# Patient Record
Sex: MALE | Race: BLACK OR AFRICAN AMERICAN | Employment: OTHER | URBAN - METROPOLITAN AREA
[De-identification: names, ages, dates, MRNs, and addresses within clinical notes are randomized per-mention and may not be internally consistent; named-entity substitution may affect disease eponyms.]

---

## 2018-09-25 ENCOUNTER — APPOINTMENT (OUTPATIENT)
Dept: GENERAL RADIOLOGY | Age: 63
End: 2018-09-25
Attending: EMERGENCY MEDICINE
Payer: COMMERCIAL

## 2018-09-25 ENCOUNTER — HOSPITAL ENCOUNTER (EMERGENCY)
Age: 63
Discharge: HOME | End: 2018-09-25
Attending: EMERGENCY MEDICINE
Payer: COMMERCIAL

## 2018-09-25 VITALS
SYSTOLIC BLOOD PRESSURE: 121 MMHG | BODY MASS INDEX: 25.2 KG/M2 | DIASTOLIC BLOOD PRESSURE: 73 MMHG | OXYGEN SATURATION: 98 % | RESPIRATION RATE: 16 BRPM | WEIGHT: 180 LBS | HEIGHT: 71 IN | HEART RATE: 59 BPM | TEMPERATURE: 98.7 F

## 2018-09-25 DIAGNOSIS — R07.9 CHEST PAIN, UNSPECIFIED TYPE: Primary | ICD-10-CM

## 2018-09-25 LAB
ALBUMIN SERPL-MCNC: 4.2 G/DL (ref 3.5–5)
ALBUMIN/GLOB SERPL: 1.3 {RATIO} (ref 1.1–2.2)
ALP SERPL-CCNC: 78 U/L (ref 45–117)
ALT SERPL-CCNC: 27 U/L (ref 12–78)
ANION GAP SERPL CALC-SCNC: 4 MMOL/L (ref 5–15)
AST SERPL-CCNC: 18 U/L (ref 15–37)
BASOPHILS # BLD: 0 K/UL (ref 0–0.1)
BASOPHILS NFR BLD: 0 % (ref 0–1)
BILIRUB SERPL-MCNC: 0.6 MG/DL (ref 0.2–1)
BUN SERPL-MCNC: 13 MG/DL (ref 6–20)
BUN/CREAT SERPL: 10 (ref 12–20)
CALCIUM SERPL-MCNC: 9.4 MG/DL (ref 8.5–10.1)
CHLORIDE SERPL-SCNC: 105 MMOL/L (ref 97–108)
CO2 SERPL-SCNC: 30 MMOL/L (ref 21–32)
COMMENT, HOLDF: NORMAL
CREAT SERPL-MCNC: 1.32 MG/DL (ref 0.7–1.3)
DIFFERENTIAL METHOD BLD: NORMAL
EOSINOPHIL # BLD: 0 K/UL (ref 0–0.4)
EOSINOPHIL NFR BLD: 1 % (ref 0–7)
ERYTHROCYTE [DISTWIDTH] IN BLOOD BY AUTOMATED COUNT: 11.9 % (ref 11.5–14.5)
GLOBULIN SER CALC-MCNC: 3.2 G/DL (ref 2–4)
GLUCOSE SERPL-MCNC: 99 MG/DL (ref 65–100)
HCT VFR BLD AUTO: 41 % (ref 36.6–50.3)
HGB BLD-MCNC: 13.3 G/DL (ref 12.1–17)
IMM GRANULOCYTES # BLD: 0 K/UL (ref 0–0.04)
IMM GRANULOCYTES NFR BLD AUTO: 0 % (ref 0–0.5)
LYMPHOCYTES # BLD: 2.1 K/UL (ref 0.8–3.5)
LYMPHOCYTES NFR BLD: 33 % (ref 12–49)
MCH RBC QN AUTO: 30.6 PG (ref 26–34)
MCHC RBC AUTO-ENTMCNC: 32.4 G/DL (ref 30–36.5)
MCV RBC AUTO: 94.3 FL (ref 80–99)
MONOCYTES # BLD: 0.5 K/UL (ref 0–1)
MONOCYTES NFR BLD: 7 % (ref 5–13)
NEUTS SEG # BLD: 3.7 K/UL (ref 1.8–8)
NEUTS SEG NFR BLD: 59 % (ref 32–75)
NRBC # BLD: 0 K/UL (ref 0–0.01)
NRBC BLD-RTO: 0 PER 100 WBC
PLATELET # BLD AUTO: 207 K/UL (ref 150–400)
PMV BLD AUTO: 9.5 FL (ref 8.9–12.9)
POTASSIUM SERPL-SCNC: 4.5 MMOL/L (ref 3.5–5.1)
PROT SERPL-MCNC: 7.4 G/DL (ref 6.4–8.2)
RBC # BLD AUTO: 4.35 M/UL (ref 4.1–5.7)
SAMPLES BEING HELD,HOLD: NORMAL
SODIUM SERPL-SCNC: 139 MMOL/L (ref 136–145)
TROPONIN I SERPL-MCNC: <0.05 NG/ML
TROPONIN I SERPL-MCNC: <0.05 NG/ML
WBC # BLD AUTO: 6.3 K/UL (ref 4.1–11.1)

## 2018-09-25 PROCEDURE — 85025 COMPLETE CBC W/AUTO DIFF WBC: CPT | Performed by: EMERGENCY MEDICINE

## 2018-09-25 PROCEDURE — 99284 EMERGENCY DEPT VISIT MOD MDM: CPT

## 2018-09-25 PROCEDURE — 71046 X-RAY EXAM CHEST 2 VIEWS: CPT

## 2018-09-25 PROCEDURE — 80053 COMPREHEN METABOLIC PANEL: CPT | Performed by: EMERGENCY MEDICINE

## 2018-09-25 PROCEDURE — 74011250637 HC RX REV CODE- 250/637: Performed by: EMERGENCY MEDICINE

## 2018-09-25 PROCEDURE — 93005 ELECTROCARDIOGRAM TRACING: CPT

## 2018-09-25 PROCEDURE — 84484 ASSAY OF TROPONIN QUANT: CPT | Performed by: EMERGENCY MEDICINE

## 2018-09-25 PROCEDURE — 36415 COLL VENOUS BLD VENIPUNCTURE: CPT | Performed by: EMERGENCY MEDICINE

## 2018-09-25 RX ORDER — GUAIFENESIN 100 MG/5ML
325 LIQUID (ML) ORAL
Status: COMPLETED | OUTPATIENT
Start: 2018-09-25 | End: 2018-09-25

## 2018-09-25 RX ADMIN — NITROGLYCERIN 1 INCH: 20 OINTMENT TOPICAL at 18:54

## 2018-09-25 RX ADMIN — ASPIRIN 81 MG 324 MG: 81 TABLET ORAL at 18:54

## 2018-09-25 NOTE — ED PROVIDER NOTES
HPI Comments: 59yo M presents from home via private vehicle with cc of chest pain. PMH sig for hyperchol, GERD, anxiety, and chest pain. Symptoms have been off and on for past few weeks. Pain located in center of chest with no radiation. Pain worsened with movement and relieved with rest.  No other exacerbating factors. Denies any assoc symptoms. Has not taken pain medication at home. Denies any h/o cad. Denies cig smoking. Pt reports having a stress test years ago that was unremarkable. Patient is a 61 y.o. male presenting with chest pain. The history is provided by the patient. Chest Pain (Angina)    Pertinent negatives include no abdominal pain, no cough, no diaphoresis, no fever and no headaches. Past Medical History:   Diagnosis Date    Anxiety attack     Chest pain     chronic complaint    Diverticulitis     Dyslipidemia      in 8/12    GERD (gastroesophageal reflux disease)     sees Dr. Hendricks Smoker       Past Surgical History:   Procedure Laterality Date    HX HEENT      ulcer removed from nose    HX OTHER SURGICAL      Stress Echo 8/21/12 - walked 10 min; 1.5 mm horizontal ST depressions in the inferolateral leads noted at peak exercise; normal stress echo     HX OTHER SURGICAL      Event Monitor - 8/2012 - patient wore for one week but then returned it since no more palpitations    HX OTHER SURGICAL      Echo [facility]) 8/21/13 - LVEF 60-65%, mild MR,          Family History:   Problem Relation Age of Onset    Cancer Mother     COPD Father        Social History     Social History    Marital status: UNKNOWN     Spouse name: N/A    Number of children: N/A    Years of education: N/A     Occupational History    Not on file.      Social History Main Topics    Smoking status: Former Smoker    Smokeless tobacco: Not on file    Alcohol use No    Drug use: No    Sexual activity: Not on file     Other Topics Concern    Not on file     Social History Narrative    No narrative on file         ALLERGIES: Penicillins    Review of Systems   Constitutional: Negative for diaphoresis and fever. HENT: Negative for facial swelling. Eyes: Negative for visual disturbance. Respiratory: Negative for cough. Cardiovascular: Positive for chest pain. Gastrointestinal: Negative for abdominal pain. Genitourinary: Negative for dysuria. Musculoskeletal: Negative for joint swelling. Skin: Negative for rash. Neurological: Negative for headaches. Hematological: Negative for adenopathy. Psychiatric/Behavioral: Negative for suicidal ideas. Vitals:    09/25/18 1709   BP: 133/82   Pulse: 74   Resp: 14   Temp: 98.7 °F (37.1 °C)   SpO2: 98%   Weight: 81.6 kg (180 lb)   Height: 5' 11\" (1.803 m)            Physical Exam   Constitutional: He is oriented to person, place, and time. He appears well-developed and well-nourished. No distress. HENT:   Head: Normocephalic and atraumatic. Mouth/Throat: Oropharynx is clear and moist.   Eyes: Pupils are equal, round, and reactive to light. Neck: Normal range of motion. Neck supple. Cardiovascular: Normal rate, regular rhythm, normal heart sounds and intact distal pulses. Pulmonary/Chest: Effort normal and breath sounds normal. No respiratory distress. Abdominal: Soft. Bowel sounds are normal. He exhibits no distension. There is no tenderness. Musculoskeletal: Normal range of motion. He exhibits no edema. Neurological: He is alert and oriented to person, place, and time. Skin: Skin is warm and dry. Nursing note and vitals reviewed. MDM  Number of Diagnoses or Management Options  Chest pain, unspecified type:   Diagnosis management comments: A:  Chest pain in 59yo M with no known h/o cad. Other than hyperchol, no other sig risk factors for ACS. P:  ecg  Labs  cxr  [employer]. Repeat trop if first set neg. ED Course       Procedures    ED EKG interpretation:  Rhythm: normal sinus rhythm.  Rate (approx.): 62.  Axis: normal.  ST segment:  No concerning ST elevations or depressions. This EKG was interpreted by Devaughn Swartz MD,ED Provider. Chest X-ray, 2 view:  Chest xray, PA and Lat, shows no signs of acute disease. This CXR was interpreted by Devaughn Swartz MD, ED Provider. Labs unremarkable. Trop and repeat were both neg. No pain at this time. Stable for discharge to f/u with cardiology as outpatient. Return to ED if symptoms worsen.

## 2018-09-26 LAB
ATRIAL RATE: 62 BPM
CALCULATED P AXIS, ECG09: 62 DEGREES
CALCULATED R AXIS, ECG10: 31 DEGREES
CALCULATED T AXIS, ECG11: 35 DEGREES
DIAGNOSIS, 93000: NORMAL
P-R INTERVAL, ECG05: 152 MS
Q-T INTERVAL, ECG07: 374 MS
QRS DURATION, ECG06: 92 MS
QTC CALCULATION (BEZET), ECG08: 379 MS
VENTRICULAR RATE, ECG03: 62 BPM

## 2018-09-26 NOTE — ED NOTES
Discharged by provider. Results and instructions reviewed by Dr. Juan Diego Catalan. Denies questions. Pt in no acute distress at time of discharge. Pt leaves ambulatory.

## 2018-09-26 NOTE — DISCHARGE INSTRUCTIONS
Chest Pain: Care Instructions  Your Care Instructions    There are many things that can cause chest pain. Some are not serious and will get better on their own in a few days. But some kinds of chest pain need more testing and treatment. Your doctor may have recommended a follow-up visit in the next 8 to 12 hours. If you are not getting better, you may need more tests or treatment. Even though your doctor has released you, you still need to watch for any problems. The doctor carefully checked you, but sometimes problems can develop later. If you have new symptoms or if your symptoms do not get better, get medical care right away. If you have worse or different chest pain or pressure that lasts more than 5 minutes or you passed out (lost consciousness), call 911 or seek other emergency help right away. A medical visit is only one step in your treatment. Even if you feel better, you still need to do what your doctor recommends, such as going to all suggested follow-up appointments and taking medicines exactly as directed. This will help you recover and help prevent future problems. How can you care for yourself at home? · Rest until you feel better. · Take your medicine exactly as prescribed. Call your doctor if you think you are having a problem with your medicine. · Do not drive after taking a prescription pain medicine. When should you call for help? Call 911 if:    · You passed out (lost consciousness).     · You have severe difficulty breathing.     · You have symptoms of a heart attack. These may include:  ¨ Chest pain or pressure, or a strange feeling in your chest.  ¨ Sweating. ¨ Shortness of breath. ¨ Nausea or vomiting. ¨ Pain, pressure, or a strange feeling in your back, neck, jaw, or upper belly or in one or both shoulders or arms. ¨ Lightheadedness or sudden weakness. ¨ A fast or irregular heartbeat.   After you call 911, the  may tell you to chew 1 adult-strength or 2 to 4 low-dose aspirin. Wait for an ambulance. Do not try to drive yourself.    Call your doctor today if:    · You have any trouble breathing.     · Your chest pain gets worse.     · You are dizzy or lightheaded, or you feel like you may faint.     · You are not getting better as expected.     · You are having new or different chest pain. Where can you learn more? Go to http://shalonda-trang.info/. Enter A120 in the search box to learn more about \"Chest Pain: Care Instructions. \"  Current as of: November 20, 2017  Content Version: 11.7  © 7484-5826 "[employer]. Care instructions adapted under license by [employer] (which disclaims liability or warranty for this information). If you have questions about a medical condition or this instruction, always ask your healthcare professional. [address] any warranty or liability for your use of this information. We hope that we have addressed all of your medical concerns. The examination and treatment you received in the Emergency Department were for an emergent problem and were not intended as complete care. It is important that you follow up with your healthcare provider(s) for ongoing care. If your symptoms worsen or do not improve as expected, and you are unable to reach your usual health care provider(s), you should return to the Emergency Department. Today's healthcare is undergoing tremendous change, and patient satisfaction surveys are one of the many tools to assess the quality of medical care. You may receive a survey from the [employer] organization regarding your experience in the Emergency Department. I hope that your experience has been completely positive, particularly the medical care that I provided. As such, please participate in the survey; anything less than excellent does not meet my expectations or intentions.         [facility] and [facility] participate in nationally recognized quality of care measures. If your blood pressure is greater than 120/80, as reported below, we urge that you seek medical care to address the potential of high blood pressure, commonly known as hypertension. Hypertension can be hereditary or can be caused by certain medical conditions, pain, stress, or \"white coat syndrome. \"       Please make an appointment with your health care provider(s) for follow up of your Emergency Department visit. VITALS:   Patient Vitals for the past 8 hrs:   Temp Pulse Resp BP SpO2   09/25/18 1948 - (!) 59 16 125/78 98 %   09/25/18 1854 - 70 - 138/76 -   09/25/18 1709 98.7 °F (37.1 °C) 74 14 133/82 98 %          Thank you for allowing us to provide you with medical care today. We realize that you have many choices for your emergency care needs. Please choose us in the future for any continued health care needs.       Tarah Emerson MD    [facility], [facility].   Office: [phone]            Recent Results (from the past 24 hour(s))   EKG, 12 LEAD, INITIAL    Collection Time: 09/25/18  5:08 PM   Result Value Ref Range    Ventricular Rate 62 BPM    Atrial Rate 62 BPM    P-R Interval 152 ms    QRS Duration 92 ms    Q-T Interval 374 ms    QTC Calculation (Bezet) 379 ms    Calculated P Axis 62 degrees    Calculated R Axis 31 degrees    Calculated T Axis 35 degrees    Diagnosis       Normal sinus rhythm with sinus arrhythmia  Normal ECG  When compared with ECG of 12-SEP-2013 10:11,  No significant change was found     CBC WITH AUTOMATED DIFF    Collection Time: 09/25/18  6:46 PM   Result Value Ref Range    WBC 6.3 4.1 - 11.1 K/uL    RBC 4.35 4.10 - 5.70 M/uL    HGB 13.3 12.1 - 17.0 g/dL    HCT 41.0 36.6 - 50.3 %    MCV 94.3 80.0 - 99.0 FL    MCH 30.6 26.0 - 34.0 PG    MCHC 32.4 30.0 - 36.5 g/dL    RDW 11.9 11.5 - 14.5 %    PLATELET 456 118 - 084 K/uL    MPV 9.5 8.9 - 12.9 FL    NRBC 0.0 0  WBC    ABSOLUTE NRBC 0.00 0.00 - 0.01 K/uL    NEUTROPHILS 59 32 - 75 %    LYMPHOCYTES 33 12 - 49 %    MONOCYTES 7 5 - 13 %    EOSINOPHILS 1 0 - 7 %    BASOPHILS 0 0 - 1 %    IMMATURE GRANULOCYTES 0 0.0 - 0.5 %    ABS. NEUTROPHILS 3.7 1.8 - 8.0 K/UL    ABS. LYMPHOCYTES 2.1 0.8 - 3.5 K/UL    ABS. MONOCYTES 0.5 0.0 - 1.0 K/UL    ABS. EOSINOPHILS 0.0 0.0 - 0.4 K/UL    ABS. BASOPHILS 0.0 0.0 - 0.1 K/UL    ABS. IMM. GRANS. 0.0 0.00 - 0.04 K/UL    DF AUTOMATED     METABOLIC PANEL, COMPREHENSIVE    Collection Time: 09/25/18  6:46 PM   Result Value Ref Range    Sodium 139 136 - 145 mmol/L    Potassium 4.5 3.5 - 5.1 mmol/L    Chloride 105 97 - 108 mmol/L    CO2 30 21 - 32 mmol/L    Anion gap 4 (L) 5 - 15 mmol/L    Glucose 99 65 - 100 mg/dL    BUN 13 6 - 20 MG/DL    Creatinine 1.32 (H) 0.70 - 1.30 MG/DL    BUN/Creatinine ratio 10 (L) 12 - 20      GFR est AA >60 >60 ml/min/1.73m2    GFR est non-AA 55 (L) >60 ml/min/1.73m2    Calcium 9.4 8.5 - 10.1 MG/DL    Bilirubin, total 0.6 0.2 - 1.0 MG/DL    ALT (SGPT) 27 12 - 78 U/L    AST (SGOT) 18 15 - 37 U/L    Alk. phosphatase 78 45 - 117 U/L    Protein, total 7.4 6.4 - 8.2 g/dL    Albumin 4.2 3.5 - 5.0 g/dL    Globulin 3.2 2.0 - 4.0 g/dL    A-G Ratio 1.3 1.1 - 2.2     TROPONIN I    Collection Time: 09/25/18  6:46 PM   Result Value Ref Range    Troponin-I, Qt. <0.05 <0.05 ng/mL   SAMPLES BEING HELD    Collection Time: 09/25/18  6:46 PM   Result Value Ref Range    SAMPLES BEING HELD SST. KARLY     COMMENT        Add-on orders for these samples will be processed based on acceptable specimen integrity and analyte stability, which may vary by analyte. TROPONIN I    Collection Time: 09/25/18  7:47 PM   Result Value Ref Range    Troponin-I, Qt. <0.05 <0.05 ng/mL       Xr Chest Pa Lat    Result Date: 9/25/2018  EXAM:  XR CHEST PA LAT INDICATION:   Chest pain with movement for the past couple of weeks and back pain. COMPARISON: Chest x-ray 9/12/2013.  FINDINGS: PA and lateral radiographs of the chest demonstrate clear lungs. The cardiac and mediastinal contours and pulmonary vascularity are normal.  The bones and soft tissues are within normal limits.      IMPRESSION: Normal.

## 2022-05-21 LAB — HBA1C MFR BLD HPLC: 6.1 %

## 2023-01-25 ENCOUNTER — OFFICE VISIT (OUTPATIENT)
Dept: CARDIOLOGY CLINIC | Age: 68
End: 2023-01-25
Payer: MEDICARE

## 2023-01-25 VITALS
SYSTOLIC BLOOD PRESSURE: 162 MMHG | WEIGHT: 205.2 LBS | HEART RATE: 56 BPM | BODY MASS INDEX: 28.73 KG/M2 | OXYGEN SATURATION: 99 % | DIASTOLIC BLOOD PRESSURE: 90 MMHG | HEIGHT: 71 IN

## 2023-01-25 DIAGNOSIS — E78.5 DYSLIPIDEMIA: ICD-10-CM

## 2023-01-25 DIAGNOSIS — R07.9 CHEST PAIN, UNSPECIFIED TYPE: Primary | ICD-10-CM

## 2023-01-25 DIAGNOSIS — F41.0 ANXIETY ATTACK: ICD-10-CM

## 2023-01-25 DIAGNOSIS — R06.02 SOB (SHORTNESS OF BREATH): ICD-10-CM

## 2023-01-25 PROCEDURE — G8427 DOCREV CUR MEDS BY ELIG CLIN: HCPCS | Performed by: SPECIALIST

## 2023-01-25 PROCEDURE — G8536 NO DOC ELDER MAL SCRN: HCPCS | Performed by: SPECIALIST

## 2023-01-25 PROCEDURE — G8417 CALC BMI ABV UP PARAM F/U: HCPCS | Performed by: SPECIALIST

## 2023-01-25 PROCEDURE — 93000 ELECTROCARDIOGRAM COMPLETE: CPT | Performed by: SPECIALIST

## 2023-01-25 PROCEDURE — G8510 SCR DEP NEG, NO PLAN REQD: HCPCS | Performed by: SPECIALIST

## 2023-01-25 PROCEDURE — 1101F PT FALLS ASSESS-DOCD LE1/YR: CPT | Performed by: SPECIALIST

## 2023-01-25 PROCEDURE — 99204 OFFICE O/P NEW MOD 45 MIN: CPT | Performed by: SPECIALIST

## 2023-01-25 PROCEDURE — 3017F COLORECTAL CA SCREEN DOC REV: CPT | Performed by: SPECIALIST

## 2023-01-25 PROCEDURE — 1123F ACP DISCUSS/DSCN MKR DOCD: CPT | Performed by: SPECIALIST

## 2023-01-25 RX ORDER — PANTOPRAZOLE SODIUM 40 MG/1
TABLET, DELAYED RELEASE ORAL
COMMUNITY
Start: 2022-10-24

## 2023-01-25 RX ORDER — SUCRALFATE 1 G/1
TABLET ORAL
COMMUNITY
Start: 2023-01-17

## 2023-01-25 RX ORDER — ROSUVASTATIN CALCIUM 10 MG/1
10 TABLET, COATED ORAL
Qty: 30 TABLET | Refills: 0
Start: 2023-01-25

## 2023-01-25 RX ORDER — AMLODIPINE BESYLATE 5 MG/1
TABLET ORAL
COMMUNITY
Start: 2023-01-19

## 2023-01-25 RX ORDER — CYCLOBENZAPRINE HCL 10 MG
TABLET ORAL
COMMUNITY

## 2023-01-25 RX ORDER — MECLIZINE HYDROCHLORIDE 25 MG/1
TABLET ORAL
COMMUNITY

## 2023-01-25 RX ORDER — ZOLPIDEM TARTRATE 10 MG/1
TABLET ORAL
COMMUNITY
Start: 2022-12-29

## 2023-01-25 RX ORDER — LOSARTAN POTASSIUM 25 MG/1
TABLET ORAL
COMMUNITY
Start: 2022-12-29

## 2023-01-25 RX ORDER — PIOGLITAZONEHYDROCHLORIDE 30 MG/1
TABLET ORAL
COMMUNITY
Start: 2022-10-20

## 2023-01-25 NOTE — PROGRESS NOTES
Chief Complaint   Patient presents with    New Patient    Chest Pain    Referral / Consult     Referred By PCP        Vitals:    01/25/23 1353 01/25/23 1408   BP: (!) 158/84 (!) 162/90   BP 1 Location: Left arm    Pulse: (!) 56    Height: 5' 11\" (1.803 m)    Weight: 205 lb 3.2 oz (93.1 kg)    SpO2: 99%          Chest pain: tightness    SOB: no    Palpitations: no    Dizziness: no    Swelling: no    Refills:       Have you been to the ER, urgent care clinic since your last visit? [facility]    Have you seen or consulted other health care providers outside of the [facility] since your last visit?  (Include any pap smears or colon screening.)      Fatigue

## 2023-01-25 NOTE — PROGRESS NOTES
Julissa Pike MD. [facility]          Patient: Ember Philip  : 1955      Today's Date: 2023        HISTORY OF PRESENT ILLNESS:     History of Present Illness:  Referred for HTN - I saw him 10 years ago. Doesn't feel good. No energy. Tired. Has had problems for years. Dizzy at times. PHELAN class 3. Has a \"lottle bit\" of random chest pain (or when turning his neck). Went to ER last week - [facility] - went home from ED. PAST MEDICAL HISTORY:     Past Medical History:   Diagnosis Date    Anxiety attack     Chest pain     chronic complaint    Diverticulitis     Dyslipidemia      in     GERD (gastroesophageal reflux disease)     sees Dr. Awan Right    History of prediabetes     HTN (hypertension)     CHERELLE on CPAP        Past Surgical History:   Procedure Laterality Date    HX HEENT      ulcer removed from nose    HX OTHER SURGICAL      Stress Echo 12 - walked 10 min; 1.5 mm horizontal ST depressions in the inferolateral leads noted at peak exercise; normal stress echo     HX OTHER SURGICAL      Event Monitor - 2012 - patient wore for one week but then returned it since no more palpitations    HX OTHER SURGICAL      Echo [facility]) 13 - LVEF 60-65%, mild MR,              CURRENT MEDICATIONS:    .  Current Outpatient Medications   Medication Sig Dispense Refill    rosuvastatin (CRESTOR) 10 mg tablet Take 1 Tablet by mouth nightly. 30 Tablet 0       Allergies   Allergen Reactions    Penicillins Other (comments)     Felt faint, dizzy         SOCIAL HISTORY:     Social History     Tobacco Use    Smoking status: Former   Substance Use Topics    Alcohol use: No    Drug use: No         FAMILY HISTORY:     Family History   Problem Relation Age of Onset    Cancer Mother     COPD Father          REVIEW OF SYMPTOMS:     Review of Symptoms:  Constitutional: Negative for fever, chills  HEENT: Negative for nosebleeds, tinnitus, and vision changes.    Respiratory: Negative for cough, wheezing  Cardiovascular: Negative for orthopnea, claudication, syncope, and PND. Gastrointestinal: Negative for abdominal pain, diarrhea, melena. Genitourinary: Negative for dysuria  Musculoskeletal: Negative for myalgias. Skin: Negative for rash  Heme: No problems bleeding. Neurological: Negative for speech change and focal weakness. PHYSICAL EXAM:     Physical Exam:  Visit Vitals  BP (!) 162/90   Pulse (!) 56   Ht 5' 11\" (1.803 m)   Wt 205 lb 3.2 oz (93.1 kg)   SpO2 99%   BMI 28.62 kg/m²       Patient appears generally well, mood and affect are appropriate and pleasant. HEENT:  Hearing intact, non-icteric, normocephalic, atraumatic. Neck Exam: Supple, No JVD or carotid bruits. Lung Exam: Clear to auscultation, even breath sounds. Cardiac Exam: Regular rate and rhythm with no murmur or rub  Abdomen: Soft, non-tender  Extremities: Moves all ext well. No lower extremity edema. MSKTL: Overall good ROM ext  Skin: No significant rashes  Psych: Appropriate affect  Neuro - Grossly intact        LABS / OTHER STUDIES:       Labs 5/22 - Cr 1.5, K 4.9, LF'Ts OK, chol 163, TG 76, HDL 55, LDL 94, TG 76, A1c 6.1  Labs 1/4/23 - Cr 1.35, CBC OK, K 5.0, chol 202, TG 75, HDL 59, , LFT's OK       CARDIAC DIAGNOSTICS:     Cardiac Evaluation Includes:  I reviewed the test results below.       EKG 1/25/23 - sinus tate, normal           ASSESSMENT AND PLAN:     Assessment and Plan:    Atypical CP,  SOB, fatigue  - has had symptoms for years   - check an exercise cardiolite and echo     HTN  - he is unsure of medications   - he says -150's lately   - he just got a medication yesterday evening but does not know what it is -- he is to start it tonight   - see him back in one month to reassess HTN  - ADDENDUM - he just picked up amlodipine and losartan and is to start that tonight     Dyslipidemia   - cont statin   - FU lipids later     CKD   - follow     Obesity   - discussed diet / exercise     See NP in one month for HTN      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 . Errol Hickey MD, [address], [address]  [place] [address] Suite [facility] [address], [facility] [address]  [address]  Ph: [phone]   Ph [phone]      [facility]   1/26/2023    [facility] records reviewed. Seen 1/16/23 for CP and palpitations.  Labs 1/16/23 - Cr 1.56 - Trop, D-Dimer, BNP all normal.     EKG 1/16/23 - NSR, non-specific T wave abn

## 2023-01-25 NOTE — PROGRESS NOTES
Orders for Check an exercise cardiolite and echo soon (SOB, CP)     See Elver Zabala in 1 month  per Dr. Ethyl Sever VO.

## 2023-03-13 ENCOUNTER — ANCILLARY PROCEDURE (OUTPATIENT)
Dept: CARDIOLOGY CLINIC | Age: 68
End: 2023-03-13

## 2023-03-13 VITALS — HEIGHT: 71 IN | BODY MASS INDEX: 28.7 KG/M2 | WEIGHT: 205 LBS

## 2023-03-13 VITALS
WEIGHT: 205 LBS | DIASTOLIC BLOOD PRESSURE: 72 MMHG | SYSTOLIC BLOOD PRESSURE: 126 MMHG | BODY MASS INDEX: 28.7 KG/M2 | HEIGHT: 71 IN

## 2023-03-13 DIAGNOSIS — R06.02 SOB (SHORTNESS OF BREATH): ICD-10-CM

## 2023-03-13 DIAGNOSIS — R07.9 CHEST PAIN, UNSPECIFIED TYPE: ICD-10-CM

## 2023-03-13 RX ORDER — TETRAKIS(2-METHOXYISOBUTYLISOCYANIDE)COPPER(I) TETRAFLUOROBORATE 1 MG/ML
30 INJECTION, POWDER, LYOPHILIZED, FOR SOLUTION INTRAVENOUS ONCE
Status: COMPLETED | OUTPATIENT
Start: 2023-03-13 | End: 2023-03-13

## 2023-03-13 RX ORDER — TETRAKIS(2-METHOXYISOBUTYLISOCYANIDE)COPPER(I) TETRAFLUOROBORATE 1 MG/ML
10 INJECTION, POWDER, LYOPHILIZED, FOR SOLUTION INTRAVENOUS ONCE
Status: COMPLETED | OUTPATIENT
Start: 2023-03-13 | End: 2023-03-13

## 2023-03-13 RX ADMIN — TETRAKIS(2-METHOXYISOBUTYLISOCYANIDE)COPPER(I) TETRAFLUOROBORATE 7.9 MILLICURIE: 1 INJECTION, POWDER, LYOPHILIZED, FOR SOLUTION INTRAVENOUS at 09:18

## 2023-03-13 RX ADMIN — TETRAKIS(2-METHOXYISOBUTYLISOCYANIDE)COPPER(I) TETRAFLUOROBORATE 26.4 MILLICURIE: 1 INJECTION, POWDER, LYOPHILIZED, FOR SOLUTION INTRAVENOUS at 10:31

## 2023-03-14 LAB
ECHO AO ASC DIAM: 3.4 CM
ECHO AO ASCENDING AORTA INDEX: 1.6 CM/M2
ECHO AO ROOT DIAM: 3.4 CM
ECHO AO ROOT INDEX: 1.6 CM/M2
ECHO AV AREA PEAK VELOCITY: 3.7 CM2
ECHO AV AREA VTI: 3.5 CM2
ECHO AV AREA/BSA PEAK VELOCITY: 1.7 CM2/M2
ECHO AV AREA/BSA VTI: 1.6 CM2/M2
ECHO AV MEAN GRADIENT: 3 MMHG
ECHO AV MEAN VELOCITY: 0.8 M/S
ECHO AV PEAK GRADIENT: 5 MMHG
ECHO AV PEAK VELOCITY: 1.1 M/S
ECHO AV VELOCITY RATIO: 0.82
ECHO AV VTI: 23.8 CM
ECHO EST RA PRESSURE: 3 MMHG
ECHO LA DIAMETER INDEX: 1.83 CM/M2
ECHO LA DIAMETER: 3.9 CM
ECHO LA TO AORTIC ROOT RATIO: 1.15
ECHO LA VOL 2C: 72 ML (ref 18–58)
ECHO LA VOL 4C: 63 ML (ref 18–58)
ECHO LA VOLUME AREA LENGTH: 74 ML
ECHO LA VOLUME INDEX A2C: 34 ML/M2 (ref 16–34)
ECHO LA VOLUME INDEX A4C: 30 ML/M2 (ref 16–34)
ECHO LA VOLUME INDEX AREA LENGTH: 35 ML/M2 (ref 16–34)
ECHO LV E' LATERAL VELOCITY: 10 CM/S
ECHO LV E' SEPTAL VELOCITY: 6 CM/S
ECHO LV EDV A2C: 121 ML
ECHO LV EDV A4C: 120 ML
ECHO LV EDV BP: 126 ML (ref 67–155)
ECHO LV EDV INDEX A4C: 56 ML/M2
ECHO LV EDV INDEX BP: 59 ML/M2
ECHO LV EDV NDEX A2C: 57 ML/M2
ECHO LV EJECTION FRACTION A2C: 58 %
ECHO LV EJECTION FRACTION A4C: 57 %
ECHO LV EJECTION FRACTION BIPLANE: 57 % (ref 55–100)
ECHO LV ESV A2C: 51 ML
ECHO LV ESV A4C: 52 ML
ECHO LV ESV BP: 54 ML (ref 22–58)
ECHO LV ESV INDEX A2C: 24 ML/M2
ECHO LV ESV INDEX A4C: 24 ML/M2
ECHO LV ESV INDEX BP: 25 ML/M2
ECHO LV FRACTIONAL SHORTENING: 31 % (ref 28–44)
ECHO LV INTERNAL DIMENSION DIASTOLE INDEX: 2.54 CM/M2
ECHO LV INTERNAL DIMENSION DIASTOLIC: 5.4 CM (ref 4.2–5.9)
ECHO LV INTERNAL DIMENSION SYSTOLIC INDEX: 1.74 CM/M2
ECHO LV INTERNAL DIMENSION SYSTOLIC: 3.7 CM
ECHO LV IVSD: 1 CM (ref 0.6–1)
ECHO LV MASS 2D: 206.7 G (ref 88–224)
ECHO LV MASS INDEX 2D: 97.1 G/M2 (ref 49–115)
ECHO LV POSTERIOR WALL DIASTOLIC: 1 CM (ref 0.6–1)
ECHO LV RELATIVE WALL THICKNESS RATIO: 0.37
ECHO LVOT AREA: 4.5 CM2
ECHO LVOT AV VTI INDEX: 0.8
ECHO LVOT DIAM: 2.4 CM
ECHO LVOT MEAN GRADIENT: 2 MMHG
ECHO LVOT PEAK GRADIENT: 3 MMHG
ECHO LVOT PEAK VELOCITY: 0.9 M/S
ECHO LVOT STROKE VOLUME INDEX: 40.3 ML/M2
ECHO LVOT SV: 85.9 ML
ECHO LVOT VTI: 19 CM
ECHO MV A VELOCITY: 0.58 M/S
ECHO MV AREA PHT: 4 CM2
ECHO MV AREA VTI: 4.3 CM2
ECHO MV E DECELERATION TIME (DT): 187.6 MS
ECHO MV E VELOCITY: 0.71 M/S
ECHO MV E/A RATIO: 1.22
ECHO MV E/E' LATERAL: 7.1
ECHO MV E/E' RATIO (AVERAGED): 9.47
ECHO MV E/E' SEPTAL: 11.83
ECHO MV LVOT VTI INDEX: 1.06
ECHO MV MAX VELOCITY: 0.7 M/S
ECHO MV MEAN GRADIENT: 0 MMHG
ECHO MV MEAN VELOCITY: 0.3 M/S
ECHO MV PEAK GRADIENT: 2 MMHG
ECHO MV PRESSURE HALF TIME (PHT): 54.4 MS
ECHO MV VTI: 20.1 CM
ECHO RA END SYSTOLIC VOLUME APICAL 4 CHAMBER INDEX BSA: 25 ML/M2
ECHO RA VOLUME: 54 ML
ECHO RIGHT VENTRICULAR SYSTOLIC PRESSURE (RVSP): 24 MMHG
ECHO RV INTERNAL DIMENSION: 4.1 CM
ECHO RV TAPSE: 2.3 CM (ref 1.7–?)
ECHO TV REGURGITANT MAX VELOCITY: 2.27 M/S
ECHO TV REGURGITANT PEAK GRADIENT: 21 MMHG
NUC STRESS EJECTION FRACTION: 58 %
STRESS ANGINA INDEX: 0
STRESS BASELINE DIAS BP: 76 MMHG
STRESS BASELINE HR: 55 BPM
STRESS BASELINE SYS BP: 122 MMHG
STRESS ESTIMATED WORKLOAD: 7 METS
STRESS EXERCISE DUR MIN: 5 MIN
STRESS EXERCISE DUR SEC: 5 SEC
STRESS O2 SAT PEAK: 98 %
STRESS O2 SAT REST: 99 %
STRESS PEAK DIAS BP: 84 MMHG
STRESS PEAK SYS BP: 178 MMHG
STRESS PERCENT HR ACHIEVED: 89 %
STRESS POST PEAK HR: 136 BPM
STRESS RATE PRESSURE PRODUCT: NORMAL BPM*MMHG
STRESS TARGET HR: 153 BPM
TID: 1.09

## 2023-03-17 ENCOUNTER — TELEPHONE (OUTPATIENT)
Dept: CARDIOLOGY CLINIC | Age: 68
End: 2023-03-17

## 2023-03-17 DIAGNOSIS — R07.9 CHEST PAIN, UNSPECIFIED TYPE: Primary | ICD-10-CM

## 2023-03-17 DIAGNOSIS — R06.02 SOB (SHORTNESS OF BREATH): ICD-10-CM

## 2023-03-17 NOTE — TELEPHONE ENCOUNTER
Called and spoke with patient regarding abnormal stress test results that demonstrate mild ischemia in RCA territory. Patient continues with chest ache that wakes him up at night and ongoing fatigue. Patient agrees to proceed with cardiac catheterization.      Discussed risks and benefits of cardiac cath +/- PCI and patient agrees to proceed  Risk is 1 in 100 for bleeding, prolonged hospital stay, groin complications, infection, tear in cardiac vessel, tamponade or deterioration in kidney function for patients with baseline kidney dysfunction prior to procedure  Risk is 1 in 1,000 of heart attack, stroke or death

## 2023-03-21 ENCOUNTER — HOSPITAL ENCOUNTER (OUTPATIENT)
Age: 68
Setting detail: OUTPATIENT SURGERY
End: 2023-03-21
Attending: STUDENT IN AN ORGANIZED HEALTH CARE EDUCATION/TRAINING PROGRAM | Admitting: STUDENT IN AN ORGANIZED HEALTH CARE EDUCATION/TRAINING PROGRAM
Payer: MEDICARE

## 2023-03-21 DIAGNOSIS — R94.39 ABNORMAL STRESS TEST: ICD-10-CM

## 2023-03-21 NOTE — Clinical Note
TRANSFER - IN REPORT:     Verbal report received from: Stephanie. Report consisted of patient's Situation, Background, Assessment and   Recommendations(SBAR). Opportunity for questions and clarification was provided. Assessment completed upon patient's arrival to unit and care assumed. Patient transported with a Registered Nurse and [address] [place] [address] / Patient Care Tech.

## 2023-03-24 DIAGNOSIS — R94.39 ABNORMAL STRESS TEST: Primary | ICD-10-CM

## 2023-03-24 DIAGNOSIS — R07.9 CHEST PAIN, UNSPECIFIED TYPE: ICD-10-CM

## 2023-03-24 RX ORDER — SODIUM CHLORIDE 0.9 % (FLUSH) 0.9 %
5-40 SYRINGE (ML) INJECTION EVERY 8 HOURS
OUTPATIENT
Start: 2023-03-24

## 2023-03-24 RX ORDER — SODIUM CHLORIDE 0.9 % (FLUSH) 0.9 %
5-40 SYRINGE (ML) INJECTION AS NEEDED
OUTPATIENT
Start: 2023-03-24

## 2023-03-24 RX ORDER — SODIUM CHLORIDE 9 MG/ML
100 INJECTION, SOLUTION INTRAVENOUS CONTINUOUS
OUTPATIENT
Start: 2023-04-04

## 2023-03-30 ENCOUNTER — TELEPHONE (OUTPATIENT)
Dept: CARDIOLOGY CLINIC | Age: 68
End: 2023-03-30

## 2023-03-30 NOTE — TELEPHONE ENCOUNTER
Called pt, unable to LVM as mailbox was full. Spoke with pt's wife (HIPAA approved), relayed msg for pt to arrive at 7:00 am.    Per Dr. Kay Johnson: \"Please have pt come 2 hours early for cath, iv hydration 200cc/hr x 2 hrs. \"    Called cath lab, spoke to [facility], let him know pt would be in at 7am.

## 2023-04-04 LAB
ANION GAP SERPL CALC-SCNC: 3 MMOL/L (ref 5–15)
BUN SERPL-MCNC: 19 MG/DL (ref 6–20)
BUN/CREAT SERPL: 12 (ref 12–20)
CALCIUM SERPL-MCNC: 9.3 MG/DL (ref 8.5–10.1)
CHLORIDE SERPL-SCNC: 109 MMOL/L (ref 97–108)
CO2 SERPL-SCNC: 26 MMOL/L (ref 21–32)
CREAT SERPL-MCNC: 1.58 MG/DL (ref 0.7–1.3)
ERYTHROCYTE [DISTWIDTH] IN BLOOD BY AUTOMATED COUNT: 12.5 % (ref 11.5–14.5)
GLUCOSE SERPL-MCNC: 99 MG/DL (ref 65–100)
HCT VFR BLD AUTO: 40.9 % (ref 36.6–50.3)
HGB BLD-MCNC: 13.6 G/DL (ref 12.1–17)
MCH RBC QN AUTO: 31.6 PG (ref 26–34)
MCHC RBC AUTO-ENTMCNC: 33.3 G/DL (ref 30–36.5)
MCV RBC AUTO: 94.9 FL (ref 80–99)
NRBC # BLD: 0 K/UL (ref 0–0.01)
NRBC BLD-RTO: 0 PER 100 WBC
PLATELET # BLD AUTO: 295 K/UL (ref 150–400)
PMV BLD AUTO: 9.2 FL (ref 8.9–12.9)
POTASSIUM SERPL-SCNC: 4.1 MMOL/L (ref 3.5–5.1)
RBC # BLD AUTO: 4.31 M/UL (ref 4.1–5.7)
SODIUM SERPL-SCNC: 138 MMOL/L (ref 136–145)
WBC # BLD AUTO: 5.3 K/UL (ref 4.1–11.1)

## 2023-04-04 PROCEDURE — 74011250636 HC RX REV CODE- 250/636: Performed by: STUDENT IN AN ORGANIZED HEALTH CARE EDUCATION/TRAINING PROGRAM

## 2023-04-04 PROCEDURE — 74011000250 HC RX REV CODE- 250: Performed by: STUDENT IN AN ORGANIZED HEALTH CARE EDUCATION/TRAINING PROGRAM

## 2023-04-04 PROCEDURE — 93458 L HRT ARTERY/VENTRICLE ANGIO: CPT | Performed by: STUDENT IN AN ORGANIZED HEALTH CARE EDUCATION/TRAINING PROGRAM

## 2023-04-04 PROCEDURE — 80048 BASIC METABOLIC PNL TOTAL CA: CPT

## 2023-04-04 PROCEDURE — 74011000636 HC RX REV CODE- 636: Performed by: STUDENT IN AN ORGANIZED HEALTH CARE EDUCATION/TRAINING PROGRAM

## 2023-04-04 PROCEDURE — 2709999900 HC NON-CHARGEABLE SUPPLY: Performed by: STUDENT IN AN ORGANIZED HEALTH CARE EDUCATION/TRAINING PROGRAM

## 2023-04-04 PROCEDURE — 77030019569 HC BND COMPR RAD TERU -B: Performed by: STUDENT IN AN ORGANIZED HEALTH CARE EDUCATION/TRAINING PROGRAM

## 2023-04-04 PROCEDURE — 36415 COLL VENOUS BLD VENIPUNCTURE: CPT

## 2023-04-04 PROCEDURE — C1894 INTRO/SHEATH, NON-LASER: HCPCS | Performed by: STUDENT IN AN ORGANIZED HEALTH CARE EDUCATION/TRAINING PROGRAM

## 2023-04-04 PROCEDURE — 77030008543 HC TBNG MON PRSS MRTM -A: Performed by: STUDENT IN AN ORGANIZED HEALTH CARE EDUCATION/TRAINING PROGRAM

## 2023-04-04 PROCEDURE — 99152 MOD SED SAME PHYS/QHP 5/>YRS: CPT | Performed by: STUDENT IN AN ORGANIZED HEALTH CARE EDUCATION/TRAINING PROGRAM

## 2023-04-04 PROCEDURE — 85027 COMPLETE CBC AUTOMATED: CPT

## 2023-04-04 PROCEDURE — 77030040934 HC CATH DIAG DXTERITY MEDT -A: Performed by: STUDENT IN AN ORGANIZED HEALTH CARE EDUCATION/TRAINING PROGRAM

## 2023-04-04 PROCEDURE — C1769 GUIDE WIRE: HCPCS | Performed by: STUDENT IN AN ORGANIZED HEALTH CARE EDUCATION/TRAINING PROGRAM

## 2023-04-04 RX ORDER — HEPARIN SODIUM 1000 [USP'U]/ML
INJECTION, SOLUTION INTRAVENOUS; SUBCUTANEOUS AS NEEDED
Status: DISCONTINUED
Start: 2023-04-04 | End: 2023-04-04 | Stop reason: HOSPADM

## 2023-04-04 RX ORDER — SODIUM CHLORIDE 0.9 % (FLUSH) 0.9 %
5-40 SYRINGE (ML) INJECTION AS NEEDED
Status: DISCONTINUED
Start: 2023-04-04 | End: 2023-04-04 | Stop reason: HOSPADM

## 2023-04-04 RX ORDER — HEPARIN SODIUM 200 [USP'U]/100ML
INJECTION, SOLUTION INTRAVENOUS
Status: COMPLETED
Start: 2023-04-04 | End: 2023-04-04

## 2023-04-04 RX ORDER — SODIUM CHLORIDE 9 MG/ML
100 INJECTION, SOLUTION INTRAVENOUS CONTINUOUS
Status: DISCONTINUED
Start: 2023-04-04 | End: 2023-04-04 | Stop reason: HOSPADM

## 2023-04-04 RX ORDER — SODIUM CHLORIDE 0.9 % (FLUSH) 0.9 %
5-40 SYRINGE (ML) INJECTION EVERY 8 HOURS
Status: DISCONTINUED
Start: 2023-04-04 | End: 2023-04-04 | Stop reason: HOSPADM

## 2023-04-04 RX ORDER — LIDOCAINE HYDROCHLORIDE 10 MG/ML
INJECTION INFILTRATION; PERINEURAL AS NEEDED
Status: DISCONTINUED
Start: 2023-04-04 | End: 2023-04-04 | Stop reason: HOSPADM

## 2023-04-04 RX ORDER — FENTANYL CITRATE 50 UG/ML
INJECTION, SOLUTION INTRAMUSCULAR; INTRAVENOUS AS NEEDED
Status: DISCONTINUED
Start: 2023-04-04 | End: 2023-04-04 | Stop reason: HOSPADM

## 2023-04-04 RX ORDER — MIDAZOLAM HYDROCHLORIDE 1 MG/ML
INJECTION, SOLUTION INTRAMUSCULAR; INTRAVENOUS AS NEEDED
Status: DISCONTINUED
Start: 2023-04-04 | End: 2023-04-04 | Stop reason: HOSPADM

## 2023-04-04 RX ORDER — VERAPAMIL HYDROCHLORIDE 2.5 MG/ML
INJECTION, SOLUTION INTRAVENOUS AS NEEDED
Status: DISCONTINUED
Start: 2023-04-04 | End: 2023-04-04 | Stop reason: HOSPADM

## 2023-04-04 RX ADMIN — SODIUM CHLORIDE 100 ML/HR: 9 INJECTION, SOLUTION INTRAVENOUS at 10:51

## 2023-04-04 NOTE — ROUTINE PROCESS
7: 17 AM  Patient arrived. ID and allergies verified verbally with patient. Pt voices understanding of procedure to be performed. Consent obtained. Pt prepped for procedure. 9:15 AM  TRANSFER - OUT REPORT:    Verbal report given to Chino(name) karmen Christensen  being transferred to Cath lab(unit) for ordered procedure       Report consisted of patients Situation, Background, Assessment and   Recommendations(SBAR). Information from the following report(s) SBAR and Kardex was reviewed with the receiving nurse. Lines:   Peripheral IV 04/04/23 Anterior;Proximal;Right Forearm (Active)   Site Assessment Clean, dry, & intact 04/04/23 0746   Phlebitis Assessment 0 04/04/23 0746   Infiltration Assessment 0 04/04/23 0746   Dressing Status Clean, dry, & intact 04/04/23 0746   Dressing Type Tape;Transparent 04/04/23 0746   Hub Color/Line Status Pink 04/04/23 0746   Action Taken Open ports on tubing capped 04/04/23 0746   Alcohol Cap Used Yes 04/04/23 0746        Opportunity for questions and clarification was provided. Patient transported with:   Tech    9:50 AM  TRANSFER - IN REPORT:    Verbal report received from Angelia(name) on Yue Christensen  being received from cath lab(unit) for routine post - op      Report consisted of patients Situation, Background, Assessment and   Recommendations(SBAR). Information from the following report(s) Procedure Summary was reviewed with the receiving nurse. Opportunity for questions and clarification was provided. Assessment completed upon patients arrival to unit and care assumed. 10:44 AM  2 ml air released from TR Band. No bleeding or hematoma noted. Radial and Ulnar pulse on right wrist palpable. Pt tolerated well. Will continue to monitor. 10:49 AM  3 ml air released from TR Band. No bleeding or hematoma noted. Radial and Ulnar pulse on right wrist palpable. Pt tolerated well. Will continue to monitor. 10:54 AM  4 ml air released from TR Band.  No From: ANTWON HENDERSON  To: Kp Nayak MD  Sent: 12/5/2019 11:13 PM CST  Subject: Medication Question    There is a pre-authorization request that has been ent several times in the last week to your office for my Vyvanse from my new insurance. I am out of the meds now and was wondering why this hasn't been taken care of yet.     Thank you,  Antwon   bleeding or hematoma noted. Radial and Ulnar pulse on right wrist palpable. Pt tolerated well. Will continue to monitor. Air release completed. TR Band removed from right wrist. No bleeding or  Hematoma. Dressing applied. Wrist immobilizer in place. Radial and ulnar pulse remain palpable on affected extremity. Pt tolerated well. Instructions given to pt regarding movement and activity restrictions. Pt voiced understanding. 11:30 AM  Discharge instructions reviewed with patient and family. Voiced understanding. Patient given copy of discharge instructions to take home. 11:45 AM  Pt sat on side of bed then ambulated around unit and to restroom. Voided. Returned to chair. Right wrist site dressing dry and intact. No bleeding or hematoma. Pt voices no complaints. 11:55 AM  Pt discharged via wheelchair with wife. Personal belongings with patient upon discharge.

## 2023-04-04 NOTE — H&P
History and physical has been reviewed. The patient has been examined. There have been no significant clinical changes since the completion of the originally dated History and Physical.    Risk and benefit of cardiac catheterization/PCI was described in detail to patient.  (risk of vascular access complication with potential surgical intervention for management, stroke, myocardial infarction, emergent open heart surgery and death). Patient wishes to proceed with coronary angiography with possible intervention. Labs   Lab Results   Component Value Date/Time    Sodium 138 04/04/2023 07:45 AM    Potassium 4.1 04/04/2023 07:45 AM    Chloride 109 (H) 04/04/2023 07:45 AM    CO2 26 04/04/2023 07:45 AM    Anion gap 3 (L) 04/04/2023 07:45 AM    Glucose 99 04/04/2023 07:45 AM    BUN 19 04/04/2023 07:45 AM    Creatinine 1.58 (H) 04/04/2023 07:45 AM    BUN/Creatinine ratio 12 04/04/2023 07:45 AM    GFR est AA >60 09/25/2018 06:46 PM    GFR est non-AA 55 (L) 09/25/2018 06:46 PM    eGFR 48 (L) 04/04/2023 07:45 AM    eGFR 52 (L) 03/27/2023 09:23 AM    Calcium 9.3 04/04/2023 07:45 AM     Lab Results   Component Value Date/Time    WBC 5.3 04/04/2023 07:45 AM    Hemoglobin (POC) 13.9 12/01/2014 12:47 PM    HGB 13.6 04/04/2023 07:45 AM    Hematocrit (POC) 41 12/01/2014 12:47 PM    HCT 40.9 04/04/2023 07:45 AM    PLATELET 759 69/95/4785 07:45 AM    MCV 94.9 04/04/2023 07:45 AM     03/13/23    NUCLEAR CARDIAC STRESS TEST 03/14/2023 3/21/2023    Interpretation Summary    Stress Test: A Olaf protocol stress test was performed. Overall, the patient's exercise capacity was average for their age. The patient reached stage 2 of the protocol and was stressed for 5 min and 5 sec. Hemodynamics are adequate for diagnosis. Blood pressure demonstrated a normal response and heart rate demonstrated a normal response to stress.  The patient's heart rate recovery was normal. The patient reported dyspnea, fatigue and no chest pain during the stress test.    Stress ECG: Ischemic ST changes with horizontal 1 mm ST depression in the inferolateral leads at peak exercise. Perfusion Comments: LV perfusion is abnormal.  There is a small sized, moderate grade, partially reversible defect in the inferior wall. SSS = 4, SRS = 1, SDS = 3. Findings suggest mild ischemia in RCA territory. Stress Function: Left ventricular function post-stress is normal. Post-stress ejection fraction is 58%. The stress end diastolic cavity size is normal.    Signed by: Marni Roldan MD on 3/14/2023  1:11 PM      ASA 3  Salvatore Oviedo MD. [facility]              Patient: Gus Bullard  : 1955        Today's Date: 2023           HISTORY OF PRESENT ILLNESS:      History of Present Illness:  Referred for HTN - I saw him 10 years ago. Doesn't feel good. No energy. Tired. Has had problems for years. Dizzy at times. PHELAN class 3. Has a \"lottle bit\" of random chest pain (or when turning his neck). Went to ER last week - [facility] - went home from ED.             PAST MEDICAL HISTORY:           Past Medical History:   Diagnosis Date    Anxiety attack      Chest pain       chronic complaint    Diverticulitis      Dyslipidemia        in     GERD (gastroesophageal reflux disease)       sees Dr. Reny Stuart    History of prediabetes      HTN (hypertension)      CHERELLE on CPAP                 Past Surgical History:   Procedure Laterality Date    HX HEENT         ulcer removed from nose    HX OTHER SURGICAL         Stress Echo 12 - walked 10 min; 1.5 mm horizontal ST depressions in the inferolateral leads noted at peak exercise; normal stress echo     HX OTHER SURGICAL         Event Monitor - 2012 - patient wore for one week but then returned it since no more palpitations    HX OTHER SURGICAL         Echo [facility]) 13 - LVEF 60-65%, mild MR,                   CURRENT MEDICATIONS:    .         Current Outpatient Medications   Medication Sig Dispense Refill rosuvastatin (CRESTOR) 10 mg tablet Take 1 Tablet by mouth nightly. 30 Tablet 0               Allergies   Allergen Reactions    Penicillins Other (comments)       Felt faint, dizzy            SOCIAL HISTORY:      Social History           Tobacco Use    Smoking status: Former   Substance Use Topics    Alcohol use: No    Drug use: No            FAMILY HISTORY:            Family History   Problem Relation Age of Onset    Cancer Mother      COPD Father              REVIEW OF SYMPTOMS:      Review of Symptoms:  Constitutional: Negative for fever, chills  HEENT: Negative for nosebleeds, tinnitus, and vision changes. Respiratory: Negative for cough, wheezing  Cardiovascular: Negative for orthopnea, claudication, syncope, and PND. Gastrointestinal: Negative for abdominal pain, diarrhea, melena. Genitourinary: Negative for dysuria  Musculoskeletal: Negative for myalgias. Skin: Negative for rash  Heme: No problems bleeding. Neurological: Negative for speech change and focal weakness. PHYSICAL EXAM:      Physical Exam:  Visit Vitals  BP (!) 162/90   Pulse (!) 56   Ht 5' 11\" (1.803 m)   Wt 205 lb 3.2 oz (93.1 kg)   SpO2 99%   BMI 28.62 kg/m²         Patient appears generally well, mood and affect are appropriate and pleasant. HEENT:  Hearing intact, non-icteric, normocephalic, atraumatic. Neck Exam: Supple, No JVD or carotid bruits. Lung Exam: Clear to auscultation, even breath sounds. Cardiac Exam: Regular rate and rhythm with no murmur or rub  Abdomen: Soft, non-tender  Extremities: Moves all ext well. No lower extremity edema.   MSKTL: Overall good ROM ext  Skin: No significant rashes  Psych: Appropriate affect  Neuro - Grossly intact           LABS / OTHER STUDIES:         Labs 5/22 - Cr 1.5, K 4.9, LF'Ts OK, chol 163, TG 76, HDL 55, LDL 94, TG 76, A1c 6.1  Labs 1/4/23 - Cr 1.35, CBC OK, K 5.0, chol 202, TG 75, HDL 59, , LFT's OK         CARDIAC DIAGNOSTICS:      Cardiac Evaluation Includes:  I reviewed the test results below. EKG 1/25/23 - sinus tate, normal               ASSESSMENT AND PLAN:      Assessment and Plan:     Atypical CP,  SOB, fatigue  - has had symptoms for years   - check an exercise cardiolite and echo      HTN  - he is unsure of medications   - he says -150's lately   - he just got a medication yesterday evening but does not know what it is -- he is to start it tonight   - see him back in one month to reassess HTN  - ADDENDUM - he just picked up amlodipine and losartan and is to start that tonight      Dyslipidemia   - cont statin   - FU lipids later      CKD   - follow      Obesity   - discussed diet / exercise      See NP in one month for HTN        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 . Raul Edward MD, [address]  Ph: [phone]                               Ph [phone]        [facility]   1/26/2023     W records reviewed. Seen 1/16/23 for CP and palpitations.  Labs 1/16/23 - Cr 1.56 - Trop, D-Dimer, BNP all normal.      EKG 1/16/23 - NSR, non-specific T wave abn                   Electronically signed by Orquidea Louis MD at 01/25/23 1440   Electronically signed by Orquidea Louis MD at 01/25/23 1446   Electronically signed by Orquidea Louis MD at 01/26/23 0366  Note Details    Author Orquidea Louis MD File Time 01/26/23 6118   Author Type Physician Status Addendum   Last  Orquidea Louis MD Specialty Cardiovascular Disease Physician     Office Visit on 1/25/2023    Office Visit on 1/25/2023      Revision History      Detailed Report    Note shared with patient

## 2023-04-04 NOTE — DISCHARGE INSTRUCTIONS
Transradial Cardiac Catheterization/Angiography Discharge Instructions    It is normal to feel tired the first couple days. Take it easy and follow the physicians instructions. Wear wrist splint for first 24 hours to keep the wrist stable. No repetitive flexing of wrist.       CHECK THE CATHETER INSERTION SITE DAILY:  Remove the wrist dressing 24 hours after the procedure. You may shower 24 hours after the procedure. Wash with soap and water and pat dry. Gentle cleaning of the site with soap and water is sufficient, cover with a dry clean dressing or bandage. Do not apply creams or powders to the area. No soaking the wrist for 3 days. Leave the puncture site open to air after 24 hours post-procedure. CALL THE PHYSICIANS:   If you have signs of infection, such as:            - A fever  If the site becomes red, swollen or feels warm to the touch  If there is drainage or if there is unusual pain at the radial site. MONITOR FOR BLEEDING:    If there is any minor oozing, you may apply a band-aid and remove after 12 hours. If the bleeding continues or if there is a lot of bleeding hold pressure with the middle finger against the puncture site and the thumb against the back of the wrist,call 911 to be transported to the hospital.  DO NOT DRIVE YOURSELF, [place] [address]. ACTIVITY:   For the first 24 hours do not manipulate the wrist.  No lifting, pushing or pulling over 3-5 pounds with the affected wrist for 7 daysand no straining the insertion site. Do not life grocery bags or the garbage can, do not run the vacuum  or  for 7 days. Start with short walks as in the hospital and gradually increase as tolerated each day. It is recommended to walk 30 minutes 5-7 days per week. Follow your physicians instructions on activity. Avoid walking outside in extremes of heat or cold. Walk inside when it is cold and windy or hot and humid.      Things to keep in mind:  No driving for at least 24 hours, or as designated by your physician. Limit the number of times you go up and down the stairs  Take rests and pace yourself with activity. Be careful and do not strain with bowel movements. Diet:  Drink plenty of fluids for the next 24 - 48 hours to help your body flush out the dye. If you have kidney, heart, or liver disease and have to limit fluids, talk with your doctor before you increase the amount of fluids you drink. Keep eating a heart-healthy, low-fat diet that has lots of fruits, vegetables, and whole grains. Post Sedation: Care Instructions  Overview  Sedation is the use of medicine to help you feel relaxed and comfortable during a procedure. The medicine is usually given in a vein (by IV). There are different levels of sedation. They range from being awake but relaxed to being completely unconscious. Which level you have will depend on the procedure and your needs. You will be watched closely by a doctor or nurse during sedation. Common side effects from sedation include:  Feeling sleepy or tired. (Your doctors and nurses will make sure you aren't too sleepy to go home.)  Feeling dizzy or unsteady. How can you care for yourself at home? Activity    Don't do anything that requires attention to detail until you recover. This includes going to work or school, making important decisions, and signing any legal documents. It takes time for the medicine effects to completely wear off. For at least 24 hours, do not drive or operate any machinery. When you get home, make sure to rest until the anesthesia has worn off. Some people will feel drowsy or dizzy for up to a few hours after leaving the hospital.     Take your time and walk slowly. Sudden changes in position may cause nausea. Rest when you feel tired. Getting enough sleep will help you recover. If you have sleep apnea and you have a CPAP machine, be sure to use it.    Diet    Don't drink alcohol for 24 hours. If your stomach is upset, try clear liquids and bland, low-fat foods like plain toast or rice. Drink plenty of fluids (unless your doctor tells you not to). When should you call for help? Call 911 anytime you think you may need emergency care. For example, call if:    You have trouble breathing. You passed out (lost consciousness). Call your doctor now or seek immediate medical care if:    You have nausea or vomiting that gets worse or won't stop. You have a fever. You have a new or worse headache. The medicine is not wearing off and you can't think clearly.

## 2023-04-04 NOTE — PROCEDURES
BRIEF PROCEDURE NOTE    Date of Procedure: 4/4/2023   Preoperative Diagnosis: abnormal stress test  Postoperative Diagnosis: angiographically w/o  significant disease    Procedure: Left heart cath, coronary angiography, moderate sedation  Interventional Cardiologist: Lara Quezada DO  Assistant: None  Anesthesia: local + IV moderate sedation   I administered moderate sedation throughout this procedure. An independent trained observer pushed medications at my direction, and monitored the patients level of consciousness and physiological status throughout. Estimated Blood Loss: Minimal    Access: right radial artery, 6F  Catheters:  Left coronary: jl 3.5, 5f  Right coronary: jr 4, 5f    Findings:   L Main: large caliber, minimal disease  LAD: large caliber, moderate diagonal, minimal disease  LCx: large caliber, moderate to large om1, small om2, minimal disease  RCA:large caliber, moderate pda, moderate pl branch, minimal disease    LVEDP:  <10 mmhg         Specimens Removed: None    Implants: n/a    Closure Device: radial TR band    See full cath note. Complications: none      Findings:  1. Minimal disease by coronary angiography  2. Normal lvedp    Plan:    False + perfusion abnormality on myocardial perfusion imaging.     DO Lazaro Teran DO  [facility] [id number]                                              Office [phone],[facility] [phone]

## 2023-04-23 DIAGNOSIS — R07.9 CHEST PAIN, UNSPECIFIED TYPE: Primary | ICD-10-CM

## 2023-04-23 DIAGNOSIS — R06.02 SOB (SHORTNESS OF BREATH): ICD-10-CM

## 2023-04-24 DIAGNOSIS — R07.9 CHEST PAIN, UNSPECIFIED TYPE: Primary | ICD-10-CM

## 2023-04-24 DIAGNOSIS — R06.02 SOB (SHORTNESS OF BREATH): ICD-10-CM

## 2024-08-08 ENCOUNTER — TELEPHONE (OUTPATIENT)
Age: 69
End: 2024-08-08

## 2024-08-08 ENCOUNTER — OFFICE VISIT (OUTPATIENT)
Age: 69
End: 2024-08-08
Payer: MEDICARE

## 2024-08-08 VITALS
SYSTOLIC BLOOD PRESSURE: 130 MMHG | HEIGHT: 71 IN | DIASTOLIC BLOOD PRESSURE: 70 MMHG | OXYGEN SATURATION: 98 % | HEART RATE: 73 BPM | BODY MASS INDEX: 27.58 KG/M2 | WEIGHT: 197 LBS

## 2024-08-08 DIAGNOSIS — I10 ESSENTIAL (PRIMARY) HYPERTENSION: ICD-10-CM

## 2024-08-08 DIAGNOSIS — R00.2 PALPITATIONS: ICD-10-CM

## 2024-08-08 DIAGNOSIS — E78.2 MIXED DYSLIPIDEMIA: ICD-10-CM

## 2024-08-08 DIAGNOSIS — R42 VERTIGO: Primary | ICD-10-CM

## 2024-08-08 DIAGNOSIS — R42 DIZZINESS: ICD-10-CM

## 2024-08-08 PROCEDURE — 3017F COLORECTAL CA SCREEN DOC REV: CPT | Performed by: SPECIALIST

## 2024-08-08 PROCEDURE — 3078F DIAST BP <80 MM HG: CPT | Performed by: SPECIALIST

## 2024-08-08 PROCEDURE — 3074F SYST BP LT 130 MM HG: CPT | Performed by: SPECIALIST

## 2024-08-08 PROCEDURE — 99214 OFFICE O/P EST MOD 30 MIN: CPT | Performed by: SPECIALIST

## 2024-08-08 PROCEDURE — 93005 ELECTROCARDIOGRAM TRACING: CPT | Performed by: SPECIALIST

## 2024-08-08 PROCEDURE — G8419 CALC BMI OUT NRM PARAM NOF/U: HCPCS | Performed by: SPECIALIST

## 2024-08-08 PROCEDURE — 93010 ELECTROCARDIOGRAM REPORT: CPT | Performed by: SPECIALIST

## 2024-08-08 PROCEDURE — 1123F ACP DISCUSS/DSCN MKR DOCD: CPT | Performed by: SPECIALIST

## 2024-08-08 PROCEDURE — 1036F TOBACCO NON-USER: CPT | Performed by: SPECIALIST

## 2024-08-08 PROCEDURE — G8427 DOCREV CUR MEDS BY ELIG CLIN: HCPCS | Performed by: SPECIALIST

## 2024-08-08 NOTE — PROGRESS NOTES
Kristi Caldera MD. [facility]          Patient: Tripp Salguero  : 1955      Today's Date: 2024        HISTORY OF PRESENT ILLNESS:     History of Present Illness:    Dizziness really bad.  Hx of vertigo.  Takes sinus tablet and puts cotton in his ears.     Palpitations last few days also.  Heart pounding last night, woke him up.  Stops breathing a lot.  Apt for sleep medicine ?  Uses sleep machine every night.    Notices symptoms when he has less sleep.   Denies any sig CP or SOB       PAST MEDICAL HISTORY:     Past Medical History:   Diagnosis Date    Anxiety attack     Chest pain     chronic complaint    Diverticulitis     Dyslipidemia      in     GERD (gastroesophageal reflux disease)     sees Dr. Lizama    History of prediabetes     HTN (hypertension)     WILD on CPAP        Past Surgical History:   Procedure Laterality Date    HEENT      ulcer removed from nose    OTHER SURGICAL HISTORY      Echo ([facility]) 13 - LVEF 60-65%, mild MR,     OTHER SURGICAL HISTORY      Event Monitor - 2012 - patient wore for one week but then returned it since no more palpitations    OTHER SURGICAL HISTORY      Stress Echo 12 - walked 10 min; 1.5 mm horizontal ST depressions in the inferolateral leads noted at peak exercise; normal stress echo              CURRENT MEDICATIONS:    .  Current Outpatient Medications   Medication Sig Dispense Refill    amLODIPine (NORVASC) 5 MG tablet ceived the following from [facility]: Outside name: amLODIPine (NORVASC) 5 mg tablet      cyclobenzaprine (FLEXERIL) 10 MG tablet Take by mouth 3 times daily as needed      meclizine (ANTIVERT) 25 MG tablet Take by mouth 3 times daily as needed      pantoprazole (PROTONIX) 40 MG tablet Take 1 tablet by mouth daily      pioglitazone (ACTOS) 30 MG tablet TAKE 1 TABLET BY MOUTH ONCE DAILY FOR 90 DAYS      rosuvastatin (CRESTOR) 10 MG tablet Take 1 tablet by mouth nightly      zolpidem (AMBIEN) 10 MG tablet TAKE 1

## 2024-08-08 NOTE — PATIENT INSTRUCTIONS
[email or web address] and enter H967 to learn more about \"DASH Diet: Care Instructions.\"  Current as of: September 20, 2023  Content Version: 14.1  © 5322-2093 [employer].   Care instructions adapted under license by [employer]. If you have questions about a medical condition or this instruction, always ask your healthcare professional. [employer] disclaims any warranty or liability for your use of this information.

## 2024-08-08 NOTE — PROGRESS NOTES
Chief Complaint   Patient presents with    Follow-up    Palpitations    Shortness of Breath    Chest Pain     Vitals:    08/08/24 0843 08/08/24 0900 08/08/24 0902   BP: 120/68 128/70 130/70   Site: Left Upper Arm Left Upper Arm Left Upper Arm   Position: Supine Standing Standing   Cuff Size: Medium Adult Medium Adult Medium Adult   Pulse: 59 58 73   SpO2: 98%     Weight: 89.4 kg (197 lb)     Height: 1.803 m (5' 11\")         Chest pain NO     ER, urgent care, or hospitalized outside of [facility] since your last visit?  NO     Refills NO     Dizziness really bad.  Hx of vertigo.  Takes sinus tablet and puts cotton in his ears.    Palpitations last few days also.  Heart pounding last night, woke him up.  Stops breathing a lot.  Apt for sleep medicine 8/13?  Uses sleep machine every night.    Needs to see PCP about blood sugar.

## 2024-08-08 NOTE — TELEPHONE ENCOUNTER
----- Message from Kristi Caldera MD sent at 2024  9:17 AM EDT -----  Regardin week holter  Can you please send him a 1 week holter for palpitations     Thanks  SK

## (undated) DEVICE — GUIDEWIRE VASC L180CM DIA0.035IN 3MM PTFE J TIP EXCHG FIX

## (undated) DEVICE — SUPPORT WRST AD W3.5XL9IN DIA14.5IN ART SFT ADJ HK AND LOOP

## (undated) DEVICE — CATH 5F 100CM JR40 -- DXTERITY

## (undated) DEVICE — CATH 5F 100CM JL35 -- DXTERITY

## (undated) DEVICE — TR BAND RADIAL ARTERY COMPRESSION DEVICE: Brand: TR BAND

## (undated) DEVICE — TUBING PRSS MON L12IN PVC RIG NONEXPANDING M TO FEM CONN

## (undated) DEVICE — HEART CATH-SFMC: Brand: MEDLINE INDUSTRIES, INC.

## (undated) DEVICE — GLIDESHEATH SLENDER STAINLESS STEEL KIT: Brand: GLIDESHEATH SLENDER